# Patient Record
Sex: FEMALE | Race: OTHER
[De-identification: names, ages, dates, MRNs, and addresses within clinical notes are randomized per-mention and may not be internally consistent; named-entity substitution may affect disease eponyms.]

---

## 2021-03-26 ENCOUNTER — HOSPITAL ENCOUNTER (OUTPATIENT)
Dept: HOSPITAL 72 - SUR | Age: 36
Discharge: HOME | End: 2021-03-26
Payer: COMMERCIAL

## 2021-03-26 VITALS — DIASTOLIC BLOOD PRESSURE: 66 MMHG | SYSTOLIC BLOOD PRESSURE: 101 MMHG

## 2021-03-26 VITALS — DIASTOLIC BLOOD PRESSURE: 65 MMHG | SYSTOLIC BLOOD PRESSURE: 96 MMHG

## 2021-03-26 VITALS — SYSTOLIC BLOOD PRESSURE: 98 MMHG | DIASTOLIC BLOOD PRESSURE: 61 MMHG

## 2021-03-26 VITALS — SYSTOLIC BLOOD PRESSURE: 120 MMHG | DIASTOLIC BLOOD PRESSURE: 71 MMHG

## 2021-03-26 VITALS — SYSTOLIC BLOOD PRESSURE: 101 MMHG | DIASTOLIC BLOOD PRESSURE: 64 MMHG

## 2021-03-26 VITALS — SYSTOLIC BLOOD PRESSURE: 96 MMHG | DIASTOLIC BLOOD PRESSURE: 58 MMHG

## 2021-03-26 VITALS — DIASTOLIC BLOOD PRESSURE: 54 MMHG | SYSTOLIC BLOOD PRESSURE: 92 MMHG

## 2021-03-26 VITALS — WEIGHT: 207 LBS | HEIGHT: 65 IN | BODY MASS INDEX: 34.49 KG/M2

## 2021-03-26 VITALS — SYSTOLIC BLOOD PRESSURE: 102 MMHG | DIASTOLIC BLOOD PRESSURE: 61 MMHG

## 2021-03-26 VITALS — SYSTOLIC BLOOD PRESSURE: 96 MMHG | DIASTOLIC BLOOD PRESSURE: 65 MMHG

## 2021-03-26 VITALS — SYSTOLIC BLOOD PRESSURE: 98 MMHG | DIASTOLIC BLOOD PRESSURE: 56 MMHG

## 2021-03-26 VITALS — DIASTOLIC BLOOD PRESSURE: 63 MMHG | SYSTOLIC BLOOD PRESSURE: 96 MMHG

## 2021-03-26 VITALS — DIASTOLIC BLOOD PRESSURE: 58 MMHG | SYSTOLIC BLOOD PRESSURE: 104 MMHG

## 2021-03-26 VITALS — SYSTOLIC BLOOD PRESSURE: 99 MMHG | DIASTOLIC BLOOD PRESSURE: 69 MMHG

## 2021-03-26 VITALS — DIASTOLIC BLOOD PRESSURE: 71 MMHG | SYSTOLIC BLOOD PRESSURE: 119 MMHG

## 2021-03-26 DIAGNOSIS — F41.9: ICD-10-CM

## 2021-03-26 DIAGNOSIS — G56.03: Primary | ICD-10-CM

## 2021-03-26 DIAGNOSIS — K21.9: ICD-10-CM

## 2021-03-26 DIAGNOSIS — F32.9: ICD-10-CM

## 2021-03-26 DIAGNOSIS — E66.9: ICD-10-CM

## 2021-03-26 PROCEDURE — 81025 URINE PREGNANCY TEST: CPT

## 2021-03-26 PROCEDURE — 94003 VENT MGMT INPAT SUBQ DAY: CPT

## 2021-03-26 PROCEDURE — 94150 VITAL CAPACITY TEST: CPT

## 2021-03-26 PROCEDURE — 64721 CARPAL TUNNEL SURGERY: CPT

## 2021-03-26 NOTE — IMMEDIATE POST-OP EVALUATION
Immediate Post-Op Evalulation


Immediate Post-Op Evalulation


Procedure:  R Carpal Tunnel Repair


Date of Evaluation:  Mar 26, 2021


Time of Evaluation:  08:35


IV Fluids:  800 LR


Blood Products:  0


Estimated Blood Loss:  3


Urinary Output:  0


Blood Pressure Systolic:  120


Blood Pressure Diastolic:  71


Pulse Rate:  74


Respiratory Rate:  16


O2 Sat by Pulse Oximetry:  100


Temperature (Fahrenheit):  97.5


Pain Score (1-10):  1


Nausea:  No


Vomiting:  No


Complications


0


Patient Status:  awake, reacts, patent, none


Hydration Status:  adequate


Dru Gram Ancef IV


Given Within 1 Hr of Incision:  Yes


Time Given:  07:26











Guillermo Duarte MD                Mar 26, 2021 06:42

## 2021-03-26 NOTE — 48 HOUR POST ANESTHESIA EVAL
Post Anesthesia Evaluation


Procedure:  R Carpal Tunnel Repair


Date of Evaluation:  Mar 26, 2021


Time of Evaluation:  10:46


Blood Pressure Systolic:  133


0:  89


Pulse Rate:  76


Respiratory Rate:  18


Temperature (Fahrenheit):  98


O2 Sat by Pulse Oximetry:  97


Airway:  patent


Nausea:  No


Vomiting:  No


Pain Intensity:  1


Hydration Status:  adequate


Cardiopulmonary Status:


Stable


Mental Status/LOC:  patient returned to baseline


Follow-up Care/Observations:


0


Post-Anesthesia Complications:


0


Follow-up care needed:  ready to discharge











Guillermo Duarte MD                Mar 26, 2021 06:42

## 2021-03-26 NOTE — ANETHESIA PREOPERATIVE EVAL
Anesthesia Pre-op PMH/ROS


General


Date of Evaluation:  Mar 26, 2021


Time of Evaluation:  06:49


Anesthesiologist:  Felicia


ASA Score:  ASA 3


Mallampati Score


Class I : Soft palate, uvula, fauces, pillars visible


Class II: Soft palate, uvula, fauces visible


Class III: Soft palate, base of uvula visible


Class IV: Only hard plate visible


Mallampati Classification:  Class II


Surgeon:  Davey


Diagnosis:  R Carpal Tunnel


Surgical Procedure:  R Carpal Tunnel Repair


Anesthesia History:  none


Family History:  no anesthesia problems


Allergies:  


Coded Allergies:  


     No Known Allergies (Unverified , 3/24/21)


Medications:  see eMAR


Patient NPO?:  Yes





Past Medical History


Gastrointestinal/Genitourinary:  Reports: GERD


Neurologic/Psychiatric:  Reports: depression/anxiety


Other:  obesity - BMI 36





Anesthesia Pre-op Phys. Exam


Physician Exam





Last Vital Signs








  Date Time  Temp Pulse Resp B/P (MAP) Pulse Ox O2 Delivery O2 Flow Rate FiO2


 


3/26/21 05:57      Room Air  


 


3/26/21 05:57 97.2 78 18 119/71 98   








Constitutional:  NAD


Neurologic:  CN 2-12 intact


Cardiovascular:  RRR


Respiratory:  CTA


Gastrointestinal:  S/NT/ND





Airway Exam


Mallampati Score:  Class II


MO:  full


ROM:  limited


Teeth:  missing, intact





Anesthesia Pre-op A/P


Labs


Urine Pregnancy Test











Test


 3/26/21


05:30


 


Urine HCG, Qualitative Pending  











Risk Assessment & Plan


Assessment:


ASA 3


Plan:


GA, SED


Status Change Before Surgery:  No





Pre-Antibiotics


Dru Gram Ancef IV


Given Within 1 Hr of Incision:  Yes


Time Given:  07:26











Guillermo Duarte MD                Mar 26, 2021 06:41

## 2021-03-26 NOTE — PRE-PROCEDURE NOTE/ATTESTATION
Pre-Procedure Note/Attestation


Complete Prior to Procedure


Planned Procedure:  right


Procedure Narrative:


Right carpal tunnel release





Indications for Procedure


Pre-Operative Diagnosis:


Right carpal tunnel syndrome





Attestation


I attest that I discussed the nature of the procedure; its benefits; risks and 

complications; and alternatives (and the risks and benefits of such 

alternatives), prior to the procedure, with the patient (or the patient's legal 

representative).





I attest that, if there was a reasonable possibility of needing a blood 

transfusion, the patient (or the patient's legal representative) was given the 

[facility] standardized written summary, pursuant 

to the [place] Blood Safety Act ([place] Health and Safety Code # 1645, as 

amended).





I attest that I re-evaluated the patient just prior to the surgery and that 

there has been no change in the patient's H&P, except as documented below:











Hector Mariscal MD               Mar 26, 2021 07:27

## 2021-03-26 NOTE — BRIEF OPERATIVE NOTE
Immediate Post Operative Note


Operative Note


Chief Complaint:  right hand numbness weakness


Pre-op Diagnosis:


Right carpal tunnel syndrome


Procedure:


Right carpal tunnel release


Post-op Diagnosis:  same as pre-op


Findings:  consistent w/pre-op dx studies


Surgeon:  Davey


Anesthesiologist:  Felicia


Anesthesia:  regional


Specimen:  none


Complications:  none


Condition:  stable


Fluids:  IVF


Estimated Blood Loss:  minimal


Drains:  none


Implant(s) used?:  No











Hector Mariscal MD               Mar 26, 2021 07:27

## 2021-03-29 NOTE — OPERATIVE NOTE - DICTATED
DATE OF OPERATION:  03/26/2021

PREOPERATIVE DIAGNOSES:

1. Right carpal tunnel syndrome.

2. Left carpal tunnel syndrome.



POSTOPERATIVE DIAGNOSES:

1. Right carpal tunnel syndrome.

2. Left carpal tunnel syndrome.



OPERATIVE PROCEDURE:  Right-sided carpal tunnel decompression.



ANESTHESIA:  Local anesthesia block.



ANESTHESIOLOGIST:  Guillermo Duarte MD



SURGEON:  Hector Mariscal MD



INTRAOPERATIVE FINDINGS:  The right carpal tunnel demonstrated significant

pressure, impingement on the median nerve.  There is some component of

scarring after dissection through the carpal tunnel.  The carpal tunnel

glide instrument was placed deep to the ligament and using a [place] 57

blade, we were able to provide proximal and distal carpal tunnel release,

completing without any difficulties and/or complications.  There were no

neural anatomy abnormalities noted within the carpal tunnel itself.



INDICATION FOR THE PROCEDURE:  The patient is a 35-year-old female who has

a history of bilateral carpal tunnel syndrome.  She decided to have a

course of conservative management including, but not limited to, physical

therapy, braces, anti-inflammatories, and now presents for definitive

management in the form of carpal tunnel release, decompress.



We had a long discussion with her regarding the risks and benefits of

surgery.  Our discussion included, but was not limited to, infection,

bleeding, persistent pain, persistent numbness, the possibility for

weakness, nerve damage intraoperatively, which could lead to weakness in

terms of  strength, weakness from the standpoint of hand, individual

finger strain, persistent numbness, tingling, worsening of the symptom,

need for revision surgery.  She understood these and elected to proceed.



DESCRIPTION OF PROCEDURE:  Dr. Guillermo Duarte performed a local anesthetic

block and with the assistance of the entire operative team, she was placed

in the supine position on the operative hand table.  The wound was prepped

and draped in usual sterile condition.  The wound was exsanguinated and

afterwards a tourniquet was applied for a total tourniquet time of 22

minutes.



A surgical time-out was called, which corroborated right carpal tunnel

release and decompression.  _____ hand  in the right hand,  was placed

in the supine position.  Using a 15-blade scalpel, the incision was made

in the volar aspect of her palm.  Afterwards, dissection was carried

through the skin, subcutaneous tissues until the subdermal fat was

visualized.  The palmaris longus was visualized.  This was all retracted

ulnar and using a Penfield 4, blunt dissection was carried through the

skin and subcutaneous soft tissues until the carpal tunnel ligament was

identified, and blunt dissection was used to mora the carpal tunnel

ligament and the carpal tunnel director was placed underneath using a #57

[place] blade _____ carpal tunnel guide for initially distal _____ of the

carpal tunnel ligament and afterwards proximal _____ of the ligament _____

decompression of ligament, we confirmed this with the carpal tunnel

director itself, which demonstrated complete release of the carpal tunnel

ligament throughout its proximal and distal aspects.  Next, we turned our

attention to closure.  Closure consisted of 2-0 Vicryl in an interrupted

fashion, afterwards Dermabond, followed by Ioban and fluff with Webril for

the skin.



The patient tolerated the procedure very well with no complications

whatsoever.  She will present into the outpatient unit for further

monitoring and will be discharged today and will follow back with us

ultimately if she would like to proceed _____ left carpal tunnel

compression.









  ______________________________________________

  Hector Mariscal M.D.





DR:  [email or web address]

D:  03/26/2021 09:31

T:  03/27/2021 10:40

JOB#:  [id number]

CC: